# Patient Record
Sex: MALE | Race: WHITE
[De-identification: names, ages, dates, MRNs, and addresses within clinical notes are randomized per-mention and may not be internally consistent; named-entity substitution may affect disease eponyms.]

---

## 2019-03-12 ENCOUNTER — HOSPITAL ENCOUNTER (INPATIENT)
Dept: HOSPITAL 88 - ER | Age: 54
LOS: 2 days | Discharge: HOME | DRG: 418 | End: 2019-03-14
Attending: INTERNAL MEDICINE | Admitting: INTERNAL MEDICINE
Payer: COMMERCIAL

## 2019-03-12 VITALS — DIASTOLIC BLOOD PRESSURE: 79 MMHG | SYSTOLIC BLOOD PRESSURE: 155 MMHG

## 2019-03-12 VITALS — DIASTOLIC BLOOD PRESSURE: 64 MMHG | SYSTOLIC BLOOD PRESSURE: 143 MMHG

## 2019-03-12 VITALS — SYSTOLIC BLOOD PRESSURE: 143 MMHG | DIASTOLIC BLOOD PRESSURE: 64 MMHG

## 2019-03-12 VITALS — SYSTOLIC BLOOD PRESSURE: 155 MMHG | DIASTOLIC BLOOD PRESSURE: 79 MMHG

## 2019-03-12 VITALS — WEIGHT: 246.25 LBS | BODY MASS INDEX: 34.48 KG/M2 | HEIGHT: 71 IN

## 2019-03-12 VITALS — SYSTOLIC BLOOD PRESSURE: 123 MMHG | DIASTOLIC BLOOD PRESSURE: 69 MMHG

## 2019-03-12 VITALS — SYSTOLIC BLOOD PRESSURE: 144 MMHG | DIASTOLIC BLOOD PRESSURE: 73 MMHG

## 2019-03-12 DIAGNOSIS — F17.290: ICD-10-CM

## 2019-03-12 DIAGNOSIS — R03.0: ICD-10-CM

## 2019-03-12 DIAGNOSIS — E66.9: ICD-10-CM

## 2019-03-12 DIAGNOSIS — E87.1: ICD-10-CM

## 2019-03-12 DIAGNOSIS — K80.00: Primary | ICD-10-CM

## 2019-03-12 LAB
ALBUMIN SERPL-MCNC: 3.9 G/DL (ref 3.5–5)
ALBUMIN/GLOB SERPL: 1.3 {RATIO} (ref 0.8–2)
ALP SERPL-CCNC: 81 IU/L (ref 40–150)
ALT SERPL-CCNC: 16 IU/L (ref 0–55)
AMYLASE SERPL-CCNC: 69 U/L (ref 25–125)
ANION GAP SERPL CALC-SCNC: 11.4 MMOL/L (ref 8–16)
ANION GAP SERPL CALC-SCNC: 11.6 MMOL/L (ref 8–16)
BACTERIA URNS QL MICRO: (no result) /HPF
BASOPHILS # BLD AUTO: 0 10*3/UL (ref 0–0.1)
BASOPHILS # BLD AUTO: 0 10*3/UL (ref 0–0.1)
BASOPHILS NFR BLD AUTO: 0.2 % (ref 0–1)
BASOPHILS NFR BLD AUTO: 0.2 % (ref 0–1)
BILIRUB UR QL: NEGATIVE
BUN SERPL-MCNC: 11 MG/DL (ref 7–26)
BUN SERPL-MCNC: 14 MG/DL (ref 7–26)
BUN/CREAT SERPL: 13 (ref 6–25)
BUN/CREAT SERPL: 15 (ref 6–25)
CALCIUM SERPL-MCNC: 8.8 MG/DL (ref 8.4–10.2)
CALCIUM SERPL-MCNC: 9.4 MG/DL (ref 8.4–10.2)
CHLORIDE SERPL-SCNC: 101 MMOL/L (ref 98–107)
CHLORIDE SERPL-SCNC: 104 MMOL/L (ref 98–107)
CK MB SERPL-MCNC: 0.8 NG/ML (ref 0–5)
CK SERPL-CCNC: 193 IU/L (ref 30–200)
CLARITY UR: CLEAR
CO2 SERPL-SCNC: 25 MMOL/L (ref 22–29)
CO2 SERPL-SCNC: 26 MMOL/L (ref 22–29)
COLOR UR: YELLOW
DEPRECATED NEUTROPHILS # BLD AUTO: 13.7 10*3/UL (ref 2.1–6.9)
DEPRECATED NEUTROPHILS # BLD AUTO: 14 10*3/UL (ref 2.1–6.9)
DEPRECATED RBC URNS MANUAL-ACNC: (no result) /HPF (ref 0–5)
EGFRCR SERPLBLD CKD-EPI 2021: > 60 ML/MIN (ref 60–?)
EGFRCR SERPLBLD CKD-EPI 2021: > 60 ML/MIN (ref 60–?)
EOSINOPHIL # BLD AUTO: 0 10*3/UL (ref 0–0.4)
EOSINOPHIL # BLD AUTO: 0 10*3/UL (ref 0–0.4)
EOSINOPHIL NFR BLD AUTO: 0.1 % (ref 0–6)
EOSINOPHIL NFR BLD AUTO: 0.2 % (ref 0–6)
EPI CELLS URNS QL MICRO: (no result) /LPF
ERYTHROCYTE [DISTWIDTH] IN CORD BLOOD: 11.9 % (ref 11.7–14.4)
ERYTHROCYTE [DISTWIDTH] IN CORD BLOOD: 11.9 % (ref 11.7–14.4)
GLOBULIN PLAS-MCNC: 2.9 G/DL (ref 2.3–3.5)
GLUCOSE SERPLBLD-MCNC: 130 MG/DL (ref 74–118)
GLUCOSE SERPLBLD-MCNC: 143 MG/DL (ref 74–118)
HCT VFR BLD AUTO: 41.8 % (ref 38.2–49.6)
HCT VFR BLD AUTO: 41.9 % (ref 38.2–49.6)
HGB BLD-MCNC: 14.3 G/DL (ref 14–18)
HGB BLD-MCNC: 14.5 G/DL (ref 14–18)
KETONES UR QL STRIP.AUTO: (no result)
LEUKOCYTE ESTERASE UR QL STRIP.AUTO: NEGATIVE
LIPASE SERPL-CCNC: 18 U/L (ref 8–78)
LYMPHOCYTES # BLD: 1 10*3/UL (ref 1–3.2)
LYMPHOCYTES # BLD: 1.3 10*3/UL (ref 1–3.2)
LYMPHOCYTES NFR BLD AUTO: 6.3 % (ref 18–39.1)
LYMPHOCYTES NFR BLD AUTO: 7.7 % (ref 18–39.1)
MCH RBC QN AUTO: 31.4 PG (ref 28–32)
MCH RBC QN AUTO: 31.5 PG (ref 28–32)
MCHC RBC AUTO-ENTMCNC: 34.2 G/DL (ref 31–35)
MCHC RBC AUTO-ENTMCNC: 34.6 G/DL (ref 31–35)
MCV RBC AUTO: 91.1 FL (ref 81–99)
MCV RBC AUTO: 91.9 FL (ref 81–99)
MONOCYTES # BLD AUTO: 0.9 10*3/UL (ref 0.2–0.8)
MONOCYTES # BLD AUTO: 1.6 10*3/UL (ref 0.2–0.8)
MONOCYTES NFR BLD AUTO: 5.7 % (ref 4.4–11.3)
MONOCYTES NFR BLD AUTO: 9.4 % (ref 4.4–11.3)
NEUTS SEG NFR BLD AUTO: 82.2 % (ref 38.7–80)
NEUTS SEG NFR BLD AUTO: 87.2 % (ref 38.7–80)
NITRITE UR QL STRIP.AUTO: NEGATIVE
PLATELET # BLD AUTO: 224 X10E3/UL (ref 140–360)
PLATELET # BLD AUTO: 231 X10E3/UL (ref 140–360)
POTASSIUM SERPL-SCNC: 3.6 MMOL/L (ref 3.5–5.1)
POTASSIUM SERPL-SCNC: 4.4 MMOL/L (ref 3.5–5.1)
PROT UR QL STRIP.AUTO: NEGATIVE
RBC # BLD AUTO: 4.55 X10E6/UL (ref 4.3–5.7)
RBC # BLD AUTO: 4.6 X10E6/UL (ref 4.3–5.7)
SODIUM SERPL-SCNC: 134 MMOL/L (ref 136–145)
SODIUM SERPL-SCNC: 137 MMOL/L (ref 136–145)
SP GR UR STRIP: 1.02 (ref 1.01–1.02)
UROBILINOGEN UR STRIP-MCNC: 0.2 MG/DL (ref 0.2–1)
WBC #/AREA URNS HPF: (no result) /HPF (ref 0–5)

## 2019-03-12 PROCEDURE — 96367 TX/PROPH/DG ADDL SEQ IV INF: CPT

## 2019-03-12 PROCEDURE — 93005 ELECTROCARDIOGRAM TRACING: CPT

## 2019-03-12 PROCEDURE — 74177 CT ABD & PELVIS W/CONTRAST: CPT

## 2019-03-12 PROCEDURE — 83735 ASSAY OF MAGNESIUM: CPT

## 2019-03-12 PROCEDURE — 88304 TISSUE EXAM BY PATHOLOGIST: CPT

## 2019-03-12 PROCEDURE — 96375 TX/PRO/DX INJ NEW DRUG ADDON: CPT

## 2019-03-12 PROCEDURE — 96374 THER/PROPH/DIAG INJ IV PUSH: CPT

## 2019-03-12 PROCEDURE — 36415 COLL VENOUS BLD VENIPUNCTURE: CPT

## 2019-03-12 PROCEDURE — 80048 BASIC METABOLIC PNL TOTAL CA: CPT

## 2019-03-12 PROCEDURE — 85025 COMPLETE CBC W/AUTO DIFF WBC: CPT

## 2019-03-12 PROCEDURE — 83036 HEMOGLOBIN GLYCOSYLATED A1C: CPT

## 2019-03-12 PROCEDURE — 96376 TX/PRO/DX INJ SAME DRUG ADON: CPT

## 2019-03-12 PROCEDURE — 0FT44ZZ RESECTION OF GALLBLADDER, PERCUTANEOUS ENDOSCOPIC APPROACH: ICD-10-PCS | Performed by: SURGERY

## 2019-03-12 PROCEDURE — 82553 CREATINE MB FRACTION: CPT

## 2019-03-12 PROCEDURE — 76705 ECHO EXAM OF ABDOMEN: CPT

## 2019-03-12 PROCEDURE — 82150 ASSAY OF AMYLASE: CPT

## 2019-03-12 PROCEDURE — 99284 EMERGENCY DEPT VISIT MOD MDM: CPT

## 2019-03-12 PROCEDURE — 84484 ASSAY OF TROPONIN QUANT: CPT

## 2019-03-12 PROCEDURE — 81001 URINALYSIS AUTO W/SCOPE: CPT

## 2019-03-12 PROCEDURE — 80053 COMPREHEN METABOLIC PANEL: CPT

## 2019-03-12 PROCEDURE — 82550 ASSAY OF CK (CPK): CPT

## 2019-03-12 PROCEDURE — 83690 ASSAY OF LIPASE: CPT

## 2019-03-12 RX ADMIN — SODIUM CHLORIDE SCH MG: 900 INJECTION INTRAVENOUS at 14:30

## 2019-03-12 RX ADMIN — METRONIDAZOLE SCH MLS/HR: 500 INJECTION, SOLUTION INTRAVENOUS at 17:09

## 2019-03-12 RX ADMIN — CEFTRIAXONE SCH MLS/HR: 2 INJECTION, SOLUTION INTRAVENOUS at 14:30

## 2019-03-12 RX ADMIN — SODIUM CHLORIDE SCH MLS/HR: 9 INJECTION, SOLUTION INTRAVENOUS at 23:27

## 2019-03-12 RX ADMIN — FAMOTIDINE SCH MG: 10 INJECTION, SOLUTION INTRAVENOUS at 17:09

## 2019-03-12 RX ADMIN — SODIUM CHLORIDE SCH MLS/HR: 9 INJECTION, SOLUTION INTRAVENOUS at 07:26

## 2019-03-12 NOTE — XMS REPORT
Patient Summary Document

                             Created on: 2019



SANTIAGO GOVEA

External Reference #: [id number]

: 1965

Sex: Male



Demographics







                          Address                   [address] [place] [address]  [id number]

 

                          Home Phone                [phone]

 

                          Preferred Language        Unknown

 

                          Marital Status            Unknown

 

                          Yarsani Affiliation     Unknown

 

                          Race                      Unknown

 

                                        Additional Race(s)  

 

                          Ethnic Group              Unknown





Author







                          Author                    [facility]

 

                          Address                   Unknown

 

                          Phone                     Unavailable







Care Team Providers







                    Care Team Member Name    Role                Phone

 

                    TORIBIO CAROLINA HENDERSON    Unavailable         Unavailable







Problems

This patient has no known problems.



Allergies, Adverse Reactions, Alerts

This patient has no known allergies or adverse reactions.



Medications

This patient has no known medications.



Results







           Test Description    Test Time    Test Comments    Text Results    Atomic Results    Result

 Comments

 

                US GALLBLADDER    2019 04:48:00                                                            

                                              [place] [address] 
    Patient Name: SANTIAGO GOVEA                                   MR #: 
[id number]                     : 1965                                  
Age/Sex: 54/M  Acct #: [id number]                              Req #: [id number]
[id number]  Adm Physician:                                                      
Ordered by: SANTIAGO ROONEY MD                            Report #: [id number]
[id number]        Location: ER                                      Room/Bed:         
           
___________________________________________________________________________________________________
   Procedure: [id number] US/US GALLBLADDER  Exam Date: 19                  
         Exam Time: 0405                                              REPORT 
STATUS: Signed    EXAM: Gallbladder Ultrasound   INDICATION:   Right upper 
quadrant pain.   COMPARISON: None.    TECHNIQUE: Transverse and longitudinal 
images of the gallbladder were obtained.         FINDINGS:        Liver: 16.7 cm
in length. Mild prominence of portal triads.      Gallbladder:   Stones/Sludge: 
Multiple shadowing echogenic foci consistent with   cholelithiasis.   Wall: 0.28
cm.   Appearance: No wall thickening, pericholecystic fluid or hydrops.     
Sonographic Ramirez's Sign: Negative      Bile Ducts:   Intrahepatic Ducts: No 
dilatation   Extrahepatic Ducts: Common bile duct measures 0.4 cm, no dilatation
     Free Fluid:   No ascites or pleural effusion      Pancreas: Suboptimally 
visualized shadowing from overlying bowel gas.      Right kidney: 13.3 cm in 
length. No hydronephrosis, nephrolithiasis or focal   masses.      Main portal 
vein: 0.9 cm in length.      IMPRESSION:      1. Cholelithiasis. No evidence of 
cholecystitis.      2. Mild hepatomegaly. Mild diffuse prominence of portal 
triads nonspecific   finding. It can be seen in acute hepatitis and passive 
hepatic congestion.      Signed by: Dr. DEIDRE Rosario M.D. on 3/12/2019 
4:53 AM        Dictated By: IRAM ROSARIO MD, MD  Electronically Signed By: IRAM ROSARIO MD, MD on 19  Transcribed By: COOPER on 19      
COPY TO:   SANTIAGO ROONEY MD

## 2019-03-12 NOTE — OPERATIVE REPORT
DATE OF PROCEDURE:  

 

SURGEON:  Goran Roman MD

 

PREOPERATIVE DIAGNOSIS:  Acute cholecystitis secondary to cholelithiasis.

 

POSTOPERATIVE DIAGNOSIS:  Acute cholecystitis secondary to cholelithiasis.

 

PROCEDURE PERFORMED:  Laparoscopic cholecystectomy.

 

ASSISTANT:  __________

 

ESTIMATED BLOOD LOSS:  Less than 75 mL.

 

DRAINS:  One 10-mm flat Barrett-Portillo drain.

 

COMPLICATIONS:  None.

 

INDICATION AND FINDINGS:  A 54-year-old male admitted to the hospital through the

emergency room, Dr. Aravind yee, complained of abdominal pain since the day prior to

admission.  Workup revealed acute cholecystitis.  Liver chemistries were normal.  He had

by ultrasound gallstones and no ductal dilatation and no evidence of acute cholecystitis

and by CT scan, he had cholelithiasis and acute cholecystitis. 

 

INTRAOPERATIVE FINDINGS:  Cholelithiasis and acute cholecystitis with severe

inflammatory changes around the gallbladder and right upper quadrant secondary to the

above. 

 

DESCRIPTION OF THE PROCEDURE:  With the patient lying on the operating table in supine

position after administration of general anesthesia, he was prepped and draped for

laparoscopic cholecystectomy.  The procedure was begun by establishing the

pneumoperitoneum in the umbilical site after stab wound was made in the location and a

saline drop test was performed.  Pneumoperitoneum was insufflated to 15 mmHg, then the

10-11 trocar placed in that location.  The patient rotated to the left with the head up

and then we placed a 10-mm trocar in that location.  Finally, two lateral working ports,

right midclavicular line and right anterior axial line using 5-mm trocars.  Immediately

came into view, an acutely distended gallbladder with surrounding inflammatory changes

around the colon, in the area of the transverse and hepatic flexure.  The omentum was

stuck to the gallbladder.  We  that and then we went ahead and aspirated and

decompressed the gallbladder because it was tense until we were able to grasp the

gallbladder.  After we lysed the omental adhesions to the gallbladder, we exposed the

hepatoduodenal ligament by placing the patient in the head up to the left rotated

position and then we began the dissection exposing the neck of the gallbladder where

there was a large stone there and then continued the dissection on the hepatoduodenal

ligament close to the gallbladder until we identified the cystic duct, which was very

small and nondilated as it was the common bile duct.  We then went ahead and clipped the

cystic duct, which also was attached to the gallbladder with three clips distally and

one proximally and then we transected it and then we took the gallbladder down from the

liver bed using a combination of hydrodissection, traction, countertraction as well as

cautery until we detached the gallbladder, placed it in an endobag, and removed through

the umbilical port.  Under direct vision with the camera, we placed __________ Vicryl to

close the hole in the umbilical fascia.  At this point, we introduced the

pneumoperitoneum and inspected the operative field.  We removed all the blood and all

clot, established hemostasis with electrocautery and then after we were satisfied with

the hemostasis, after we had removed all the fluid that was bloody, we placed 10-mm flat

Barrett-Portillo in the right upper quadrant __________ area and brought it out through the

most lateral right 5-mm trocar in the right anterior axillary line and secured there

with 2-0 silk and then we released the pneumoperitoneum, tied the umbilical sutures and

then closed the subxiphoid port using 3-0 Vicryl as well as the umbilical site.  The

skin of all the ports was closed using staples.  0.25% Marcaine with epinephrine was

given as local block at all the port sites.  Sterile dressing was applied.  The patient

tolerated the procedure well, taken to the recovery room in stable condition.  The

family informed of his intraoperative findings.  They are aware that there are increased

chances of complications due to the acuteness of the situation. 

 

 

 

 

______________________________

MD CRIS Berry/PHILLY

D:  03/12/2019 14:29:01

T:  03/12/2019 15:31:39

Job #:  [id number]

## 2019-03-12 NOTE — NUR
RECEIVED REPORT FROM DAY NURSE. PATIENT IS RESTING COMFORTABLY IN BED. BED IS IN LOWEST 
POSITION AND CALL BELL IS WITHIN REACH. WILL CONTINUE TO MONITOR PATIENT.

## 2019-03-12 NOTE — CONSULTATION
DATE OF CONSULTATION:  03/12/2019  

 

REASON FOR CONSULTATION:  Acute cholecystitis.

 

HISTORY OF PRESENT ILLNESS:  The patient is an otherwise healthy 54-year-old male,

admitted complaining of abdominal pain since the day prior to admission.  The pain was

located in the right upper quadrant and epigastric area.  He had some vomiting.  The

pain persisted, reason for which he came to the emergency room.  The patient gives a

history of similar episodes some years ago, but there was no workup done.  The patient

in the emergency room denied any bleeding, any diarrhea, any GI problems.  An ultrasound

in the emergency room revealed cholelithiasis, no ductal dilatation.  No evidence of

acute cholecystitis.  Because of the above, he underwent CT scan of the abdomen and

pelvis with oral and IV contrast and cholecystitis acute type was found despite of the

ultrasound that failed to reveal that. 

 

PAST MEDICAL HISTORY:  Unremarkable.

 

PAST SURGICAL HISTORY:  Significant for eye surgery and hemorrhoidectomy.

 

SOCIAL HISTORY:  He does not drink.  Does not smoke.

 

REVIEW OF SYSTEMS:

Significant for what has been stated.

 

PHYSICAL EXAMINATION:

GENERAL:  Reveals a 54-year-old male, in moderate acute distress.  He looks

uncomfortable, complains of right upper quadrant pain. 

HEAD, EYES, EARS, NOSE, AND THROAT:  Unremarkable. 

LUNGS:  Clear. 

HEART:  Regular rhythm. 

ABDOMEN:  There is right upper quadrant tenderness on deep palpation.  No rebound.

LABORATORY DATA:  Admission labs revealed a white count of 16,000.  Liver chemistries

were normal. 

 

ASSESSMENT:  Acute cholecystitis secondary to cholelithiasis.

 

PLAN:  To proceed with emergent laparoscopic cholecystectomy, possible open

cholecystectomy.  The patient is aware of increased risk due to the acuteness of the

situation.  He and his wife gave an informed consent. 

 

 

 

 

______________________________

MD CRIS Berry/PHILLY

D:  03/12/2019 12:35:56

T:  03/12/2019 13:16:37

Job #:  [id number]

## 2019-03-12 NOTE — DIAGNOSTIC IMAGING REPORT
EXAM: Gallbladder Ultrasound

INDICATION:   Right upper quadrant pain.

COMPARISON: None. 

TECHNIQUE: Transverse and longitudinal images of the gallbladder were obtained.





FINDINGS:     

Liver: 16.7 cm in length. Mild prominence of portal triads.



Gallbladder:

Stones/Sludge: Multiple shadowing echogenic foci consistent with

cholelithiasis.

Wall: 0.28 cm.

Appearance: No wall thickening, pericholecystic fluid or hydrops.  

Sonographic Ramirez's Sign: Negative



Bile Ducts:

Intrahepatic Ducts: No dilatation

Extrahepatic Ducts: Common bile duct measures 0.4 cm, no dilatation



Free Fluid:

No ascites or pleural effusion



Pancreas: Suboptimally visualized shadowing from overlying bowel gas.



Right kidney: 13.3 cm in length. No hydronephrosis, nephrolithiasis or focal

masses.



Main portal vein: 0.9 cm in length.



IMPRESSION:



1. Cholelithiasis. No evidence of cholecystitis.



2. Mild hepatomegaly. Mild diffuse prominence of portal triads nonspecific

finding. It can be seen in acute hepatitis and passive hepatic congestion.



Signed by: Dr. DEIDRE Frankel M.D. on 3/12/2019 4:53 AM

## 2019-03-12 NOTE — DIAGNOSTIC IMAGING REPORT
EXAM: CT Abdomen and Pelvis WITH contrast  



INDICATION: Abdominal Pain



COMPARISON: Gallbladder ultrasound 3/12/2019. 



TECHNIQUE: Abdomen and pelvis were scanned utilizing a multidetector helical

scanner from the lung base to the pubic symphysis after administration of IV

contrast. Coronal and sagittal reformations were obtained. Routine protocol was

performed. Scan was performed when during portal venous phase.    

     IV CONTRAST: 100 cc of Isovue 370

     ORAL CONTRAST: Water and Gastrografin. 

            

COMPLICATIONS: None



RADIATION DOSE:

     Total DLP:  841.2 mGy*cm



     CTDIvol has been reviewed. It is below the limits set by the Radiation

Protocol Committee (RPC).



FINDINGS:

LINES and TUBES: None.



LOWER THORAX:  Unremarkable



HEPATOBILIARY: Mild hypodensity in the liver adjacent to the gallbladder likely

reflects edema. Hypodensity adjacent to the falciform ligament likely reflects

focal fatty infiltration.



GALLBLADDER: There are gallstones with mild gallbladder wall thickening,

pericholecystic fluid and surrounding stranding.



SPLEEN: No splenomegaly. 



PANCREAS: No focal masses or ductal dilatation.  



ADRENALS: No adrenal nodules 



KIDNEYS/URETERS: Kidneys enhance symmetrically. No evidence of hydronephrosis,

solid mass, or stone. 



GI TRACT: Despite gastrografin administration reportedly, bowel loops are not

well opacified. There is decompression of the distal colon with mild wall

thickening within the distal descending and sigmoid colon as well as rectum.

Minimal colonic wall thickening at the hepatic flexure likely is reactive

secondary to surrounding gallbladder inflammation. Normal appendix.



PELVIC ORGANS/BLADDER: Unremarkable.



LYMPH NODES: No lymphadenopathy.



VESSELS: Unremarkable.



PERITONEUM / RETROPERITONEUM: No free air. There is a trace amount of fluid

within the pelvis.



BONES AND SOFT TISSUES: No acute osseous abnormality. Sclerotic lesion within

the ischium likely represents a bone island. Degenerative changes at L5-S1.

Bilateral pars defects at L5.



CONCLUSION:

CT findings are consistent with acute cholecystitis. No evidence of abscess.



The above findings were discussed with Dr. Goran Roman on 3/12/2019 at

11:55 AM, who responded indicating that the communication was understood.



Decompressed distal colon with mild apparent wall thickening. While this could

reflect decompression, suggest clinical correlation for colitis.



Signed by: Dr. Etelvina Coleman MD on 3/12/2019 11:58 AM

## 2019-03-13 VITALS — DIASTOLIC BLOOD PRESSURE: 70 MMHG | SYSTOLIC BLOOD PRESSURE: 127 MMHG

## 2019-03-13 VITALS — DIASTOLIC BLOOD PRESSURE: 74 MMHG | SYSTOLIC BLOOD PRESSURE: 121 MMHG

## 2019-03-13 VITALS — DIASTOLIC BLOOD PRESSURE: 76 MMHG | SYSTOLIC BLOOD PRESSURE: 131 MMHG

## 2019-03-13 VITALS — DIASTOLIC BLOOD PRESSURE: 81 MMHG | SYSTOLIC BLOOD PRESSURE: 137 MMHG

## 2019-03-13 VITALS — SYSTOLIC BLOOD PRESSURE: 121 MMHG | DIASTOLIC BLOOD PRESSURE: 69 MMHG

## 2019-03-13 VITALS — SYSTOLIC BLOOD PRESSURE: 122 MMHG | DIASTOLIC BLOOD PRESSURE: 72 MMHG

## 2019-03-13 LAB
ALBUMIN SERPL-MCNC: 2.9 G/DL (ref 3.5–5)
ALBUMIN/GLOB SERPL: 1.1 {RATIO} (ref 0.8–2)
ALP SERPL-CCNC: 56 IU/L (ref 40–150)
ALT SERPL-CCNC: 31 IU/L (ref 0–55)
AMYLASE SERPL-CCNC: 38 U/L (ref 25–125)
ANION GAP SERPL CALC-SCNC: 10.8 MMOL/L (ref 8–16)
ANISOCYTOSIS BLD QL SMEAR: SLIGHT
BASOPHILS # BLD AUTO: 0 10*3/UL (ref 0–0.1)
BASOPHILS # BLD AUTO: 0 10*3/UL (ref 0–0.1)
BASOPHILS NFR BLD AUTO: 0.1 % (ref 0–1)
BASOPHILS NFR BLD AUTO: 0.1 % (ref 0–1)
BUN SERPL-MCNC: 9 MG/DL (ref 7–26)
BUN/CREAT SERPL: 10 (ref 6–25)
CALCIUM SERPL-MCNC: 8.1 MG/DL (ref 8.4–10.2)
CHLORIDE SERPL-SCNC: 103 MMOL/L (ref 98–107)
CO2 SERPL-SCNC: 25 MMOL/L (ref 22–29)
DEPRECATED NEUTROPHILS # BLD AUTO: 10 10*3/UL (ref 2.1–6.9)
DEPRECATED NEUTROPHILS # BLD AUTO: 10.7 10*3/UL (ref 2.1–6.9)
EGFRCR SERPLBLD CKD-EPI 2021: > 60 ML/MIN (ref 60–?)
EOSINOPHIL # BLD AUTO: 0 10*3/UL (ref 0–0.4)
EOSINOPHIL # BLD AUTO: 0.1 10*3/UL (ref 0–0.4)
EOSINOPHIL NFR BLD AUTO: 0.1 % (ref 0–6)
EOSINOPHIL NFR BLD AUTO: 0.4 % (ref 0–6)
ERYTHROCYTE [DISTWIDTH] IN CORD BLOOD: 12 % (ref 11.7–14.4)
ERYTHROCYTE [DISTWIDTH] IN CORD BLOOD: 12.2 % (ref 11.7–14.4)
GLOBULIN PLAS-MCNC: 2.7 G/DL (ref 2.3–3.5)
GLUCOSE SERPLBLD-MCNC: 106 MG/DL (ref 74–118)
HCT VFR BLD AUTO: 38.3 % (ref 38.2–49.6)
HCT VFR BLD AUTO: 39.3 % (ref 38.2–49.6)
HGB BLD-MCNC: 13 G/DL (ref 14–18)
HGB BLD-MCNC: 13.3 G/DL (ref 14–18)
LIPASE SERPL-CCNC: 9 U/L (ref 8–78)
LYMPHOCYTES # BLD: 1.6 10*3/UL (ref 1–3.2)
LYMPHOCYTES # BLD: 1.8 10*3/UL (ref 1–3.2)
LYMPHOCYTES NFR BLD AUTO: 11.4 % (ref 18–39.1)
LYMPHOCYTES NFR BLD AUTO: 13.7 % (ref 18–39.1)
LYMPHOCYTES NFR BLD MANUAL: 12 % (ref 19–48)
MCH RBC QN AUTO: 31.2 PG (ref 28–32)
MCH RBC QN AUTO: 31.3 PG (ref 28–32)
MCHC RBC AUTO-ENTMCNC: 33.8 G/DL (ref 31–35)
MCHC RBC AUTO-ENTMCNC: 33.9 G/DL (ref 31–35)
MCV RBC AUTO: 92.3 FL (ref 81–99)
MCV RBC AUTO: 92.3 FL (ref 81–99)
METAMYELOCYTES NFR BLD MANUAL: 1 % (ref 0–0)
MONOCYTES # BLD AUTO: 1.4 10*3/UL (ref 0.2–0.8)
MONOCYTES # BLD AUTO: 1.6 10*3/UL (ref 0.2–0.8)
MONOCYTES NFR BLD AUTO: 10.4 % (ref 4.4–11.3)
MONOCYTES NFR BLD AUTO: 11.3 % (ref 4.4–11.3)
MONOCYTES NFR BLD MANUAL: 10 % (ref 3.4–9)
NEUTS SEG NFR BLD AUTO: 75 % (ref 38.7–80)
NEUTS SEG NFR BLD AUTO: 76.6 % (ref 38.7–80)
NEUTS SEG NFR BLD MANUAL: 77 % (ref 40–74)
PLAT MORPH BLD: NORMAL
PLATELET # BLD AUTO: 193 X10E3/UL (ref 140–360)
PLATELET # BLD AUTO: 212 X10E3/UL (ref 140–360)
PLATELET # BLD EST: ADEQUATE 10*3/UL
POTASSIUM SERPL-SCNC: 3.8 MMOL/L (ref 3.5–5.1)
RBC # BLD AUTO: 4.15 X10E6/UL (ref 4.3–5.7)
RBC # BLD AUTO: 4.26 X10E6/UL (ref 4.3–5.7)
RBC MORPH BLD: NORMAL
SODIUM SERPL-SCNC: 135 MMOL/L (ref 136–145)

## 2019-03-13 RX ADMIN — SODIUM CHLORIDE SCH MG: 900 INJECTION INTRAVENOUS at 13:59

## 2019-03-13 RX ADMIN — Medication SCH MG: at 17:06

## 2019-03-13 RX ADMIN — METRONIDAZOLE SCH MLS/HR: 500 INJECTION, SOLUTION INTRAVENOUS at 17:29

## 2019-03-13 RX ADMIN — FAMOTIDINE SCH MG: 10 INJECTION, SOLUTION INTRAVENOUS at 09:09

## 2019-03-13 RX ADMIN — FAMOTIDINE SCH MG: 10 INJECTION, SOLUTION INTRAVENOUS at 17:06

## 2019-03-13 RX ADMIN — CEFTRIAXONE SCH MLS/HR: 2 INJECTION, SOLUTION INTRAVENOUS at 14:06

## 2019-03-13 RX ADMIN — METRONIDAZOLE SCH MLS/HR: 500 INJECTION, SOLUTION INTRAVENOUS at 00:53

## 2019-03-13 RX ADMIN — SODIUM CHLORIDE SCH MLS/HR: 9 INJECTION, SOLUTION INTRAVENOUS at 23:14

## 2019-03-13 RX ADMIN — METRONIDAZOLE SCH MLS/HR: 500 INJECTION, SOLUTION INTRAVENOUS at 05:26

## 2019-03-13 RX ADMIN — SODIUM CHLORIDE SCH MLS/HR: 9 INJECTION, SOLUTION INTRAVENOUS at 09:10

## 2019-03-13 RX ADMIN — SODIUM CHLORIDE SCH MLS/HR: 9 INJECTION, SOLUTION INTRAVENOUS at 09:09

## 2019-03-13 RX ADMIN — METRONIDAZOLE SCH MLS/HR: 500 INJECTION, SOLUTION INTRAVENOUS at 12:48

## 2019-03-13 RX ADMIN — METRONIDAZOLE SCH MLS/HR: 500 INJECTION, SOLUTION INTRAVENOUS at 23:13

## 2019-03-13 NOTE — NUR
Walking rounds done. Patient is awake and alert x3 in NAD. He denies any pain at this time. 
POC discussed. Patient was instructed to call for assistance and verbalized understanding. 
Bed in lowest position, locked, and call bell within reach.

## 2019-03-13 NOTE — NUR
report given to day nurse. patient is resting comfortably in bed. bed is in lowest position 
and call bell is within reach.

## 2019-03-14 VITALS — DIASTOLIC BLOOD PRESSURE: 78 MMHG | SYSTOLIC BLOOD PRESSURE: 127 MMHG

## 2019-03-14 VITALS — DIASTOLIC BLOOD PRESSURE: 72 MMHG | SYSTOLIC BLOOD PRESSURE: 120 MMHG

## 2019-03-14 VITALS — SYSTOLIC BLOOD PRESSURE: 127 MMHG | DIASTOLIC BLOOD PRESSURE: 78 MMHG

## 2019-03-14 VITALS — SYSTOLIC BLOOD PRESSURE: 124 MMHG | DIASTOLIC BLOOD PRESSURE: 78 MMHG

## 2019-03-14 VITALS — SYSTOLIC BLOOD PRESSURE: 130 MMHG | DIASTOLIC BLOOD PRESSURE: 74 MMHG

## 2019-03-14 LAB
ANION GAP SERPL CALC-SCNC: 10.6 MMOL/L (ref 8–16)
BASOPHILS # BLD AUTO: 0 10*3/UL (ref 0–0.1)
BASOPHILS NFR BLD AUTO: 0.3 % (ref 0–1)
BUN SERPL-MCNC: 9 MG/DL (ref 7–26)
BUN/CREAT SERPL: 12 (ref 6–25)
CALCIUM SERPL-MCNC: 7.7 MG/DL (ref 8.4–10.2)
CHLORIDE SERPL-SCNC: 106 MMOL/L (ref 98–107)
CO2 SERPL-SCNC: 21 MMOL/L (ref 22–29)
DEPRECATED NEUTROPHILS # BLD AUTO: 6.6 10*3/UL (ref 2.1–6.9)
EGFRCR SERPLBLD CKD-EPI 2021: > 60 ML/MIN (ref 60–?)
EOSINOPHIL # BLD AUTO: 0.1 10*3/UL (ref 0–0.4)
EOSINOPHIL NFR BLD AUTO: 1.1 % (ref 0–6)
ERYTHROCYTE [DISTWIDTH] IN CORD BLOOD: 12.1 % (ref 11.7–14.4)
GLUCOSE SERPLBLD-MCNC: 93 MG/DL (ref 74–118)
HCT VFR BLD AUTO: 36 % (ref 38.2–49.6)
HGB BLD-MCNC: 12.1 G/DL (ref 14–18)
LYMPHOCYTES # BLD: 2.1 10*3/UL (ref 1–3.2)
LYMPHOCYTES NFR BLD AUTO: 21 % (ref 18–39.1)
MAGNESIUM SERPL-MCNC: 1.9 MG/DL (ref 1.3–2.1)
MCH RBC QN AUTO: 31.2 PG (ref 28–32)
MCHC RBC AUTO-ENTMCNC: 33.6 G/DL (ref 31–35)
MCV RBC AUTO: 92.8 FL (ref 81–99)
MONOCYTES # BLD AUTO: 1.2 10*3/UL (ref 0.2–0.8)
MONOCYTES NFR BLD AUTO: 11.5 % (ref 4.4–11.3)
NEUTS SEG NFR BLD AUTO: 65.7 % (ref 38.7–80)
PLATELET # BLD AUTO: 178 X10E3/UL (ref 140–360)
POTASSIUM SERPL-SCNC: 3.6 MMOL/L (ref 3.5–5.1)
RBC # BLD AUTO: 3.88 X10E6/UL (ref 4.3–5.7)
SODIUM SERPL-SCNC: 134 MMOL/L (ref 136–145)

## 2019-03-14 RX ADMIN — FAMOTIDINE SCH MG: 10 INJECTION, SOLUTION INTRAVENOUS at 08:42

## 2019-03-14 RX ADMIN — Medication SCH MG: at 08:42

## 2019-03-14 RX ADMIN — METRONIDAZOLE SCH MLS/HR: 500 INJECTION, SOLUTION INTRAVENOUS at 11:36

## 2019-03-14 RX ADMIN — METRONIDAZOLE SCH MLS/HR: 500 INJECTION, SOLUTION INTRAVENOUS at 05:03

## 2019-03-14 NOTE — NUR
RN performed comprehensive assessment on patient. Alert and oriented x3 with vital signs 
within normal parameter. Patient reported no pain.

## 2019-03-14 NOTE — NUR
Dr. Roman at bedside, patient to be discharged after receiving Flagyl IV. Patient will 
go home with FREDDIE drain. Patient instructed only to use sponge bath at home until MD visit on 
3/18/2018

## 2019-03-14 NOTE — NUR
SOCIAL WORK INITIAL ASSESSMENT 

 to bedside to discuss plan of care with patient/family. CM/SW role and care 
transitions discussed. Anticipated discharge plan discussed along with duration of care. 
CM/SW discussed patients right to make decisions in care.  CM/SW work hours given.  

Patient lives: IN HOUSE WITH FAMILY

Admit/Transfer: VIA ED

POA/Emergency contact: WIFE SARAH [phone]

Current/Previous Home Health: NONE

PCP/Follow-up Care: SHARITA

Current/Previous DME: NONE

Other Services: NONE

Employment Status: [employer] 

Areas of Concerns: NONE

Referral Needs: NONE

Education Needs: NONE

IMM/MOON given and signed (if applicable): NA

Goal for discharge: RETURN HOME

CM/SW left business card at the bedside with contact information. Name and number was also 
written on the patients whiteboard. Patient verbalized understanding of discussion. CM will 
follow-up with ongoing discharge and transition of care needs.

## 2019-03-15 NOTE — DISCHARGE SUMMARY
ADMISSION DIAGNOSES:  Abdominal pain, cholelithiasis, hyponatremia, obesity, elevated BP

without history of hypertension. 

 

DISCHARGE DIAGNOSES:  Abdominal pain, cholelithiasis, hyponatremia, obesity, elevated BP

without history of hypertension. 

 

HISTORY:  None.

 

SURGICAL HISTORY:  Right eye laceration and hemorrhoidectomy.

 

FAMILY HISTORY:  The patient's mom and dad had cancer.

 

SOCIAL HISTORY:  The patient admits to smokeless tobacco use and occasional alcohol use.

 

HOSPITAL COURSE:  A 54-year-old male complains of epigastric pain, described as constant

aching soreness that began prior to admission.  Pain worsened with food and drinks and

improved with IV pain medications.  He admits to associated nausea and vomiting, but

denies diarrhea and fever.  On admission, the patient had ultrasound of the bladder that

showed cholelithiasis with no evidence of cholecystitis.  CT of the abdomen shows acute

cholecystitis, no evidence of abscess.  The patient had a lap paige done on 03/12/2019

with placement of a FREDDIE drain.  The patient tolerated procedure well.  Diet was advanced.

 The patient is passing gas and pain is improved.  He will discharge home with primary

care __________.  He will follow up 

with surgery as discussed.  The patient understands discharge instructions and agrees to

plan.  Vital signs stable, patient afebrile. 

 

 

Dictated by Rachel Bernard NP

 

______________________________

MD DU Larkin/PHILLY

D:  03/14/2019 18:25:05

T:  03/14/2019 20:25:52

Job #:  [id number]